# Patient Record
Sex: FEMALE | Race: WHITE | NOT HISPANIC OR LATINO | Employment: FULL TIME | URBAN - METROPOLITAN AREA
[De-identification: names, ages, dates, MRNs, and addresses within clinical notes are randomized per-mention and may not be internally consistent; named-entity substitution may affect disease eponyms.]

---

## 2023-06-15 ENCOUNTER — APPOINTMENT (OUTPATIENT)
Dept: PRIMARY CARE | Facility: CLINIC | Age: 63
End: 2023-06-15
Payer: COMMERCIAL

## 2023-06-16 ENCOUNTER — APPOINTMENT (OUTPATIENT)
Dept: PRIMARY CARE | Facility: CLINIC | Age: 63
End: 2023-06-16
Payer: COMMERCIAL

## 2023-06-19 ENCOUNTER — APPOINTMENT (OUTPATIENT)
Dept: PRIMARY CARE | Facility: CLINIC | Age: 63
End: 2023-06-19
Payer: COMMERCIAL

## 2023-06-20 ENCOUNTER — APPOINTMENT (OUTPATIENT)
Dept: PRIMARY CARE | Facility: CLINIC | Age: 63
End: 2023-06-20
Payer: COMMERCIAL

## 2023-06-21 ENCOUNTER — OFFICE VISIT (OUTPATIENT)
Dept: PRIMARY CARE | Facility: CLINIC | Age: 63
End: 2023-06-21
Payer: COMMERCIAL

## 2023-06-21 VITALS
OXYGEN SATURATION: 98 % | SYSTOLIC BLOOD PRESSURE: 180 MMHG | DIASTOLIC BLOOD PRESSURE: 120 MMHG | HEIGHT: 63 IN | RESPIRATION RATE: 18 BRPM | WEIGHT: 135.6 LBS | TEMPERATURE: 97.9 F | BODY MASS INDEX: 24.03 KG/M2 | HEART RATE: 86 BPM

## 2023-06-21 DIAGNOSIS — Z12.39 ENCOUNTER FOR SCREENING FOR MALIGNANT NEOPLASM OF BREAST, UNSPECIFIED SCREENING MODALITY: ICD-10-CM

## 2023-06-21 DIAGNOSIS — Z00.00 HEALTHCARE MAINTENANCE: Primary | ICD-10-CM

## 2023-06-21 DIAGNOSIS — D22.9 MULTIPLE NEVI: ICD-10-CM

## 2023-06-21 DIAGNOSIS — F43.9 STRESS: ICD-10-CM

## 2023-06-21 DIAGNOSIS — G47.9 SLEEP DISTURBANCE: ICD-10-CM

## 2023-06-21 DIAGNOSIS — Z12.11 SCREEN FOR COLON CANCER: ICD-10-CM

## 2023-06-21 LAB
ALANINE AMINOTRANSFERASE (SGPT) (U/L) IN SER/PLAS: 15 U/L (ref 7–45)
ALBUMIN (G/DL) IN SER/PLAS: 4.6 G/DL (ref 3.4–5)
ALKALINE PHOSPHATASE (U/L) IN SER/PLAS: 56 U/L (ref 33–136)
ANION GAP IN SER/PLAS: 14 MMOL/L (ref 10–20)
ASPARTATE AMINOTRANSFERASE (SGOT) (U/L) IN SER/PLAS: 23 U/L (ref 9–39)
BASOPHILS (10*3/UL) IN BLOOD BY AUTOMATED COUNT: 0.07 X10E9/L (ref 0–0.1)
BASOPHILS/100 LEUKOCYTES IN BLOOD BY AUTOMATED COUNT: 0.9 % (ref 0–2)
BILIRUBIN TOTAL (MG/DL) IN SER/PLAS: 0.4 MG/DL (ref 0–1.2)
CALCIUM (MG/DL) IN SER/PLAS: 9.9 MG/DL (ref 8.6–10.6)
CARBON DIOXIDE, TOTAL (MMOL/L) IN SER/PLAS: 26 MMOL/L (ref 21–32)
CHLORIDE (MMOL/L) IN SER/PLAS: 102 MMOL/L (ref 98–107)
CHOLESTEROL (MG/DL) IN SER/PLAS: 184 MG/DL (ref 0–199)
CHOLESTEROL IN HDL (MG/DL) IN SER/PLAS: 76.9 MG/DL
CHOLESTEROL/HDL RATIO: 2.4
CREATININE (MG/DL) IN SER/PLAS: 0.63 MG/DL (ref 0.5–1.05)
EOSINOPHILS (10*3/UL) IN BLOOD BY AUTOMATED COUNT: 0.06 X10E9/L (ref 0–0.7)
EOSINOPHILS/100 LEUKOCYTES IN BLOOD BY AUTOMATED COUNT: 0.8 % (ref 0–6)
ERYTHROCYTE DISTRIBUTION WIDTH (RATIO) BY AUTOMATED COUNT: 13.5 % (ref 11.5–14.5)
ERYTHROCYTE MEAN CORPUSCULAR HEMOGLOBIN CONCENTRATION (G/DL) BY AUTOMATED: 32.6 G/DL (ref 32–36)
ERYTHROCYTE MEAN CORPUSCULAR VOLUME (FL) BY AUTOMATED COUNT: 91 FL (ref 80–100)
ERYTHROCYTES (10*6/UL) IN BLOOD BY AUTOMATED COUNT: 4.79 X10E12/L (ref 4–5.2)
GFR FEMALE: >90 ML/MIN/1.73M2
GLUCOSE (MG/DL) IN SER/PLAS: 87 MG/DL (ref 74–99)
HEMATOCRIT (%) IN BLOOD BY AUTOMATED COUNT: 43.8 % (ref 36–46)
HEMOGLOBIN (G/DL) IN BLOOD: 14.3 G/DL (ref 12–16)
IMMATURE GRANULOCYTES/100 LEUKOCYTES IN BLOOD BY AUTOMATED COUNT: 0.3 % (ref 0–0.9)
LDL: 92 MG/DL (ref 0–99)
LEUKOCYTES (10*3/UL) IN BLOOD BY AUTOMATED COUNT: 7.6 X10E9/L (ref 4.4–11.3)
LYMPHOCYTES (10*3/UL) IN BLOOD BY AUTOMATED COUNT: 1.81 X10E9/L (ref 1.2–4.8)
LYMPHOCYTES/100 LEUKOCYTES IN BLOOD BY AUTOMATED COUNT: 23.9 % (ref 13–44)
MONOCYTES (10*3/UL) IN BLOOD BY AUTOMATED COUNT: 0.59 X10E9/L (ref 0.1–1)
MONOCYTES/100 LEUKOCYTES IN BLOOD BY AUTOMATED COUNT: 7.8 % (ref 2–10)
NEUTROPHILS (10*3/UL) IN BLOOD BY AUTOMATED COUNT: 5.03 X10E9/L (ref 1.2–7.7)
NEUTROPHILS/100 LEUKOCYTES IN BLOOD BY AUTOMATED COUNT: 66.3 % (ref 40–80)
NRBC (PER 100 WBCS) BY AUTOMATED COUNT: 0 /100 WBC (ref 0–0)
PLATELETS (10*3/UL) IN BLOOD AUTOMATED COUNT: 372 X10E9/L (ref 150–450)
POTASSIUM (MMOL/L) IN SER/PLAS: 4 MMOL/L (ref 3.5–5.3)
PROTEIN TOTAL: 7.6 G/DL (ref 6.4–8.2)
SODIUM (MMOL/L) IN SER/PLAS: 138 MMOL/L (ref 136–145)
TRIGLYCERIDE (MG/DL) IN SER/PLAS: 75 MG/DL (ref 0–149)
UREA NITROGEN (MG/DL) IN SER/PLAS: 6 MG/DL (ref 6–23)
VLDL: 15 MG/DL (ref 0–40)

## 2023-06-21 PROCEDURE — 80053 COMPREHEN METABOLIC PANEL: CPT

## 2023-06-21 PROCEDURE — 99396 PREV VISIT EST AGE 40-64: CPT | Performed by: NURSE PRACTITIONER

## 2023-06-21 PROCEDURE — 1036F TOBACCO NON-USER: CPT | Performed by: NURSE PRACTITIONER

## 2023-06-21 PROCEDURE — 85025 COMPLETE CBC W/AUTO DIFF WBC: CPT

## 2023-06-21 PROCEDURE — 83036 HEMOGLOBIN GLYCOSYLATED A1C: CPT

## 2023-06-21 PROCEDURE — 80061 LIPID PANEL: CPT

## 2023-06-21 PROCEDURE — 84443 ASSAY THYROID STIM HORMONE: CPT

## 2023-06-21 RX ORDER — MULTIVITAMIN
1 TABLET ORAL DAILY
COMMUNITY
Start: 2018-07-10

## 2023-06-21 RX ORDER — CALCIUM CARBONATE 600 MG
TABLET ORAL
COMMUNITY
Start: 2021-06-04

## 2023-06-21 ASSESSMENT — ENCOUNTER SYMPTOMS
HALLUCINATIONS: 0
ADENOPATHY: 0
WHEEZING: 0
DIZZINESS: 0
ABDOMINAL DISTENTION: 0
HEMATURIA: 0
UNEXPECTED WEIGHT CHANGE: 0
STRIDOR: 0
POLYDIPSIA: 0
WOUND: 0
EYE REDNESS: 0
NAUSEA: 0
BACK PAIN: 0
DYSURIA: 0
RHINORRHEA: 0
WEAKNESS: 0
AGITATION: 0
EYE ITCHING: 0
SINUS PAIN: 0
LIGHT-HEADEDNESS: 0
SHORTNESS OF BREATH: 0
SEIZURES: 0
TROUBLE SWALLOWING: 0
DYSPHORIC MOOD: 0
FACIAL SWELLING: 0
SORE THROAT: 0
POLYPHAGIA: 0
SINUS PRESSURE: 0
EYE DISCHARGE: 0
ACTIVITY CHANGE: 0
CONSTIPATION: 0
CHOKING: 0
VOMITING: 0
CHILLS: 0
ARTHRALGIAS: 0
CONFUSION: 0
JOINT SWELLING: 0
TREMORS: 0
BRUISES/BLEEDS EASILY: 0
HYPERACTIVE: 0
NECK STIFFNESS: 0
PHOTOPHOBIA: 0
MYALGIAS: 0
NECK PAIN: 0
DIARRHEA: 0
ANAL BLEEDING: 0
PALPITATIONS: 0
APPETITE CHANGE: 0
VOICE CHANGE: 0
COLOR CHANGE: 0
NUMBNESS: 0
SPEECH DIFFICULTY: 0
ABDOMINAL PAIN: 0
DIAPHORESIS: 0
FACIAL ASYMMETRY: 0
FLANK PAIN: 0
APNEA: 0
DECREASED CONCENTRATION: 0
RECTAL PAIN: 0
BLOOD IN STOOL: 0
FATIGUE: 0
FEVER: 0
CHEST TIGHTNESS: 0
HEADACHES: 0
SLEEP DISTURBANCE: 1
COUGH: 0
DIFFICULTY URINATING: 0
NERVOUS/ANXIOUS: 0
EYE PAIN: 0

## 2023-06-21 NOTE — PROGRESS NOTES
Subjective   Patient ID: Kiana Woods is a 62 y.o. female who presents for annual exam.    HPI   Patient in office for physical. Patient describes health as good. Patient has regular dental visits. Patient denies vision changes. Patient denies hearing loss.    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 Patient does not smoke; drinks alcohol occasionally. No drug use.    Reproductive health:  GTPAL: 63512  Postmenopausal    Screens:    Female:  PAP/HPV: Reviewed and current. Up-to-date on screenings with OB/GYN, per patient report  Breast Cancer: Reviewed and current. Does monthly SBE. Up-to-date on screenings with OB/GYN, per patient report  Colon cancer screening: Reviewed and current 2021/2024  Vision/hearing: Reviewed and current.       Immunizations:  TDaP: 2021 (per patient report)  Shingles: patient declines; patient will get at pharmacy   Flu: patient declines;  COVID: x 3 received; boosters at pharmacy  Hep B: patient declines    The patient declines recommended immunizations. Risks and benefits of immunizations reviewed. The patient verbalized understanding.    Concerns:  -stress due to difficult family situation; mother diagnosed with cancer; daughter will get  this summer; patient will try Melatonin for sleep   -multiple nevi; patient sees dermatology    Review of Systems   Constitutional:  Negative for activity change, appetite change, chills, diaphoresis, fatigue, fever and unexpected weight change.   HENT:  Negative for congestion, dental problem, drooling, ear discharge, ear pain, facial swelling, hearing loss, mouth sores, nosebleeds, postnasal drip, rhinorrhea, sinus pressure, sinus pain, sneezing, sore throat, tinnitus, trouble swallowing and voice change.    Eyes:  Negative for photophobia, pain, discharge, redness, itching and visual disturbance.   Respiratory:  Negative for apnea, cough, choking, chest tightness, shortness of breath,  "wheezing and stridor.    Cardiovascular:  Negative for chest pain, palpitations and leg swelling.   Gastrointestinal:  Negative for abdominal distention, abdominal pain, anal bleeding, blood in stool, constipation, diarrhea, nausea, rectal pain and vomiting.   Endocrine: Negative for cold intolerance, heat intolerance, polydipsia, polyphagia and polyuria.   Genitourinary:  Negative for decreased urine volume, difficulty urinating, dysuria, flank pain, hematuria, pelvic pain and urgency.   Musculoskeletal:  Negative for arthralgias, back pain, gait problem, joint swelling, myalgias, neck pain and neck stiffness.   Skin:  Negative for color change, pallor, rash and wound.   Neurological:  Negative for dizziness, tremors, seizures, syncope, facial asymmetry, speech difficulty, weakness, light-headedness, numbness and headaches.   Hematological:  Negative for adenopathy. Does not bruise/bleed easily.   Psychiatric/Behavioral:  Positive for sleep disturbance. Negative for agitation, behavioral problems, confusion, decreased concentration, dysphoric mood, hallucinations, self-injury and suicidal ideas. The patient is not nervous/anxious and is not hyperactive.        Objective   BP (!) 180/120   Pulse 86   Temp 36.6 °C (97.9 °F)   Resp 18   Ht 1.588 m (5' 2.5\")   Wt 61.5 kg (135 lb 9.6 oz)   SpO2 98%   BMI 24.41 kg/m²     Repeat BP: 140/82 (right arm, manual); 138/82 (left arm)     Physical Exam  Constitutional:       Appearance: Normal appearance. She is normal weight.   HENT:      Head: Normocephalic.      Right Ear: Tympanic membrane, ear canal and external ear normal.      Left Ear: Tympanic membrane, ear canal and external ear normal.      Nose: Nose normal.      Mouth/Throat:      Mouth: Mucous membranes are moist.      Pharynx: Oropharynx is clear. No oropharyngeal exudate or posterior oropharyngeal erythema.   Eyes:      General: No scleral icterus.        Right eye: No discharge.         Left eye: No " discharge.      Extraocular Movements: Extraocular movements intact.      Conjunctiva/sclera: Conjunctivae normal.      Pupils: Pupils are equal, round, and reactive to light.   Neck:      Vascular: No carotid bruit.   Cardiovascular:      Rate and Rhythm: Normal rate and regular rhythm.      Pulses: Normal pulses.      Heart sounds: Normal heart sounds.   Pulmonary:      Effort: Pulmonary effort is normal.      Breath sounds: Normal breath sounds.   Abdominal:      General: There is no distension.      Palpations: There is no mass.      Tenderness: There is no abdominal tenderness. There is no guarding or rebound.      Hernia: No hernia is present.   Musculoskeletal:         General: No swelling, tenderness, deformity or signs of injury.      Cervical back: Normal range of motion and neck supple. No rigidity or tenderness.      Right lower leg: No edema.      Left lower leg: No edema.   Lymphadenopathy:      Cervical: No cervical adenopathy.   Skin:     Coloration: Skin is not jaundiced or pale.      Findings: No bruising, erythema, lesion or rash.      Comments: Multiple nevi   Neurological:      General: No focal deficit present.      Mental Status: She is alert and oriented to person, place, and time.      Cranial Nerves: No cranial nerve deficit.      Sensory: No sensory deficit.      Motor: No weakness.      Coordination: Coordination normal.      Gait: Gait normal.   Psychiatric:         Mood and Affect: Mood normal.         Behavior: Behavior normal.         Thought Content: Thought content normal.         Judgment: Judgment normal.       Assessment/Plan     Exam findings reviewed with patient. Health screens, lab work orders, immunizations, and specialty provider follow-ups discussed. Patient will keep monitoring blood pressures at home; she will call the office with outliers or abnormal trends. We will call with lab results and update the patient on the plan of care. Follow-up in 12 months for physical or  earlier as needed.     Benefits of healthy lifestyle discussed: low carbohydrates/fat/sugar diet; use lean white instead of red meat; use several servings of fruit and vegetables daily; use whole grain flour instead of white flour products; cook at home frequently instead of eating out; exercise 30-90 minutes 5 x/week; good sleep hygiene; stress reduction and prevention strategies; avoid stimulants like caffeine and nicotine; avoid depressants like alcohol; maintain adequate support systems and positive relationships.

## 2023-06-22 LAB
ESTIMATED AVERAGE GLUCOSE FOR HBA1C: 105 MG/DL
HEMOGLOBIN A1C/HEMOGLOBIN TOTAL IN BLOOD: 5.3 %
THYROTROPIN (MIU/L) IN SER/PLAS BY DETECTION LIMIT <= 0.05 MIU/L: 1.11 MIU/L (ref 0.44–3.98)

## 2023-06-22 NOTE — RESULT ENCOUNTER NOTE
Please call the patient. All her labs are normal; fasting glucose is 87. A1C is pending but will be normal. We will complete her insurance paperwork. Please ask her if she needs us to send/fax it somewhere. Follow up in 1 year for physical and labs or earlier as needed. TY

## 2023-07-06 ENCOUNTER — TELEPHONE (OUTPATIENT)
Dept: PRIMARY CARE | Facility: CLINIC | Age: 63
End: 2023-07-06
Payer: COMMERCIAL

## 2023-07-06 DIAGNOSIS — G47.9 SLEEP DISTURBANCE: Primary | ICD-10-CM

## 2023-07-06 RX ORDER — GABAPENTIN 100 MG/1
CAPSULE ORAL
Qty: 30 CAPSULE | Refills: 1 | Status: SHIPPED | OUTPATIENT
Start: 2023-07-06 | End: 2023-07-21

## 2023-07-06 NOTE — TELEPHONE ENCOUNTER
Kiana Woods phoned, [phone]. Her mother is in hospice and her daughter is getting . She is not able to sleep at all. She has tried the melatonin you discussed. But this is not working. She is requesting that you prescribe her something mild to get her through this period.     Pharmacy: [facility], [place]

## 2024-05-21 ENCOUNTER — HOSPITAL ENCOUNTER (OUTPATIENT)
Dept: RADIOLOGY | Facility: CLINIC | Age: 64
Discharge: HOME | End: 2024-05-21
Payer: COMMERCIAL

## 2024-05-21 VITALS — BODY MASS INDEX: 24.02 KG/M2 | HEIGHT: 63 IN | WEIGHT: 135.58 LBS

## 2024-05-21 DIAGNOSIS — Z12.31 ENCOUNTER FOR SCREENING MAMMOGRAM FOR MALIGNANT NEOPLASM OF BREAST: ICD-10-CM

## 2024-05-21 PROCEDURE — 77067 SCR MAMMO BI INCL CAD: CPT | Performed by: RADIOLOGY

## 2024-05-21 PROCEDURE — 77067 SCR MAMMO BI INCL CAD: CPT

## 2024-05-21 PROCEDURE — 77063 BREAST TOMOSYNTHESIS BI: CPT | Performed by: RADIOLOGY

## 2024-06-10 ENCOUNTER — APPOINTMENT (OUTPATIENT)
Dept: PRIMARY CARE | Facility: CLINIC | Age: 64
End: 2024-06-10
Payer: COMMERCIAL

## 2024-06-11 ENCOUNTER — OFFICE VISIT (OUTPATIENT)
Dept: PRIMARY CARE | Facility: CLINIC | Age: 64
End: 2024-06-11
Payer: COMMERCIAL

## 2024-06-11 VITALS
DIASTOLIC BLOOD PRESSURE: 78 MMHG | HEART RATE: 72 BPM | SYSTOLIC BLOOD PRESSURE: 136 MMHG | OXYGEN SATURATION: 100 % | TEMPERATURE: 98 F | BODY MASS INDEX: 25.73 KG/M2 | RESPIRATION RATE: 16 BRPM | WEIGHT: 139.8 LBS | HEIGHT: 62 IN

## 2024-06-11 DIAGNOSIS — Z00.00 HEALTHCARE MAINTENANCE: Primary | ICD-10-CM

## 2024-06-11 DIAGNOSIS — Z12.11 SCREEN FOR COLON CANCER: ICD-10-CM

## 2024-06-11 DIAGNOSIS — D22.9 MULTIPLE NEVI: ICD-10-CM

## 2024-06-11 PROCEDURE — 99396 PREV VISIT EST AGE 40-64: CPT | Performed by: NURSE PRACTITIONER

## 2024-06-11 PROCEDURE — 80053 COMPREHEN METABOLIC PANEL: CPT

## 2024-06-11 PROCEDURE — 1036F TOBACCO NON-USER: CPT | Performed by: NURSE PRACTITIONER

## 2024-06-11 PROCEDURE — 83036 HEMOGLOBIN GLYCOSYLATED A1C: CPT

## 2024-06-11 PROCEDURE — 85025 COMPLETE CBC W/AUTO DIFF WBC: CPT

## 2024-06-11 PROCEDURE — 80061 LIPID PANEL: CPT

## 2024-06-11 PROCEDURE — 36415 COLL VENOUS BLD VENIPUNCTURE: CPT

## 2024-06-11 PROCEDURE — 84443 ASSAY THYROID STIM HORMONE: CPT

## 2024-06-11 ASSESSMENT — ENCOUNTER SYMPTOMS
WHEEZING: 0
WOUND: 0
PHOTOPHOBIA: 0
STRIDOR: 0
ADENOPATHY: 0
UNEXPECTED WEIGHT CHANGE: 0
NERVOUS/ANXIOUS: 0
HALLUCINATIONS: 0
SEIZURES: 0
EYE REDNESS: 0
COLOR CHANGE: 0
NUMBNESS: 0
LIGHT-HEADEDNESS: 0
NAUSEA: 0
DECREASED CONCENTRATION: 0
MYALGIAS: 0
VOMITING: 0
COUGH: 0
WEAKNESS: 0
FACIAL ASYMMETRY: 0
SINUS PRESSURE: 0
FLANK PAIN: 0
DYSPHORIC MOOD: 0
POLYPHAGIA: 0
BRUISES/BLEEDS EASILY: 0
VOICE CHANGE: 0
NECK STIFFNESS: 0
DIAPHORESIS: 0
FATIGUE: 0
CHOKING: 0
DIFFICULTY URINATING: 0
EYE PAIN: 0
CONFUSION: 0
SHORTNESS OF BREATH: 0
HYPERACTIVE: 0
APNEA: 0
RECTAL PAIN: 0
JOINT SWELLING: 0
AGITATION: 0
POLYDIPSIA: 0
SLEEP DISTURBANCE: 0
DIZZINESS: 0
NECK PAIN: 0
TREMORS: 0
SINUS PAIN: 0
CHEST TIGHTNESS: 0
SPEECH DIFFICULTY: 0
DYSURIA: 0
SORE THROAT: 0
ABDOMINAL DISTENTION: 0
DIARRHEA: 0
ABDOMINAL PAIN: 0
APPETITE CHANGE: 0
CHILLS: 0
FEVER: 0
HEADACHES: 0
BACK PAIN: 0
ACTIVITY CHANGE: 0
CONSTIPATION: 0
EYE DISCHARGE: 0
RHINORRHEA: 0
HEMATURIA: 0
ANAL BLEEDING: 0
FACIAL SWELLING: 0
PALPITATIONS: 0
ARTHRALGIAS: 0
TROUBLE SWALLOWING: 0
EYE ITCHING: 0
BLOOD IN STOOL: 0

## 2024-06-11 NOTE — PROGRESS NOTES
Subjective   Patient ID: Kiana Woods is a 63 y.o. female who presents for annual exam.    HPI   Patient in office for physical. Patient describes health as good. Patient has regular dental visits. Patient denies vision changes (sees opthalmology for mild cataracts). Patient denies hearing loss.    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 Patient does not smoke; drinks alcohol occasionally. No drug use.    Reproductive health:  GTPAL: [id number]  Postmenopausal    Screens:    Female:  PAP/HPV: Reviewed and current. Up-to-date on screenings with OB/GYN, per patient report  Breast Cancer: Reviewed and current. Does monthly SBE. Up-to-date on screenings with OB/GYN, per patient report  Colon cancer screening: Reviewed and current order  Vision/hearing: Reviewed and current.       Immunizations:  TDaP: 2021 (per patient report)  Shingles: patient declines; patient will get at pharmacy   Flu: patient declines;  COVID: x 3 received; boosters at pharmacy  Hep B: patient declines    The patient declines recommended immunizations. Risks and benefits of immunizations reviewed. The patient verbalized understanding.    Concerns:  -multiple nevi; patient sees dermatology    Review of Systems   Constitutional:  Negative for activity change, appetite change, chills, diaphoresis, fatigue, fever and unexpected weight change.   HENT:  Negative for congestion, dental problem, drooling, ear discharge, ear pain, facial swelling, hearing loss, mouth sores, nosebleeds, postnasal drip, rhinorrhea, sinus pressure, sinus pain, sneezing, sore throat, tinnitus, trouble swallowing and voice change.    Eyes:  Negative for photophobia, pain, discharge, redness, itching and visual disturbance.   Respiratory:  Negative for apnea, cough, choking, chest tightness, shortness of breath, wheezing and stridor.    Cardiovascular:  Negative for chest pain, palpitations and leg swelling.   Gastrointestinal:   "Negative for abdominal distention, abdominal pain, anal bleeding, blood in stool, constipation, diarrhea, nausea, rectal pain and vomiting.   Endocrine: Negative for cold intolerance, heat intolerance, polydipsia, polyphagia and polyuria.   Genitourinary:  Negative for decreased urine volume, difficulty urinating, dysuria, flank pain, hematuria, pelvic pain and urgency.   Musculoskeletal:  Negative for arthralgias, back pain, gait problem, joint swelling, myalgias, neck pain and neck stiffness.   Skin:  Negative for color change, pallor, rash and wound.   Neurological:  Negative for dizziness, tremors, seizures, syncope, facial asymmetry, speech difficulty, weakness, light-headedness, numbness and headaches.   Hematological:  Negative for adenopathy. Does not bruise/bleed easily.   Psychiatric/Behavioral:  Negative for agitation, behavioral problems, confusion, decreased concentration, dysphoric mood, hallucinations, self-injury, sleep disturbance and suicidal ideas. The patient is not nervous/anxious and is not hyperactive.        Objective   /78   Pulse 72   Temp 36.7 °C (98 °F) (Temporal)   Resp 16   Ht 1.575 m (5' 2\")   Wt 63.4 kg (139 lb 12.8 oz)   SpO2 100%   BMI 25.57 kg/m²       Physical Exam  Constitutional:       Appearance: Normal appearance. She is normal weight.   HENT:      Head: Normocephalic.      Right Ear: Tympanic membrane, ear canal and external ear normal.      Left Ear: Tympanic membrane, ear canal and external ear normal.      Nose: Nose normal.      Mouth/Throat:      Mouth: Mucous membranes are moist.      Pharynx: Oropharynx is clear. No oropharyngeal exudate or posterior oropharyngeal erythema.   Eyes:      General: No scleral icterus.        Right eye: No discharge.         Left eye: No discharge.      Extraocular Movements: Extraocular movements intact.      Conjunctiva/sclera: Conjunctivae normal.      Pupils: Pupils are equal, round, and reactive to light.   Neck:      " Vascular: No carotid bruit.   Cardiovascular:      Rate and Rhythm: Normal rate and regular rhythm.      Pulses: Normal pulses.      Heart sounds: Normal heart sounds.   Pulmonary:      Effort: Pulmonary effort is normal.      Breath sounds: Normal breath sounds.   Abdominal:      General: There is no distension.      Palpations: There is no mass.      Tenderness: There is no abdominal tenderness. There is no guarding or rebound.      Hernia: No hernia is present.   Musculoskeletal:         General: No swelling, tenderness, deformity or signs of injury.      Cervical back: Normal range of motion and neck supple. No rigidity or tenderness.      Right lower leg: No edema.      Left lower leg: No edema.   Lymphadenopathy:      Cervical: No cervical adenopathy.   Skin:     Coloration: Skin is not jaundiced or pale.      Findings: No bruising, erythema, lesion or rash.      Comments: Multiple nevi   Neurological:      General: No focal deficit present.      Mental Status: She is alert and oriented to person, place, and time.      Cranial Nerves: No cranial nerve deficit.      Sensory: No sensory deficit.      Motor: No weakness.      Coordination: Coordination normal.      Gait: Gait normal.   Psychiatric:         Mood and Affect: Mood normal.         Behavior: Behavior normal.         Thought Content: Thought content normal.         Judgment: Judgment normal.       Assessment/Plan     Exam findings reviewed with patient. Health screens, lab work orders, immunizations, and specialty provider follow-ups discussed. We will call with lab results and update the patient on the plan of care. Follow-up in 12 months for physical or earlier as needed.     Benefits of healthy lifestyle discussed: low carbohydrates/fat/sugar diet; use lean white instead of red meat; use several servings of fruit and vegetables daily; use whole grain flour instead of white flour products; cook at home frequently instead of eating out; exercise 30-90  minutes 5 x/week; good sleep hygiene; stress reduction and prevention strategies; avoid stimulants like caffeine and nicotine; avoid depressants like alcohol; maintain adequate support systems and positive relationships.

## 2024-06-12 LAB
ALBUMIN SERPL BCP-MCNC: 4.4 G/DL (ref 3.4–5)
ALP SERPL-CCNC: 60 U/L (ref 33–136)
ALT SERPL W P-5'-P-CCNC: 11 U/L (ref 7–45)
ANION GAP SERPL CALC-SCNC: 14 MMOL/L (ref 10–20)
AST SERPL W P-5'-P-CCNC: 17 U/L (ref 9–39)
BASOPHILS # BLD AUTO: 0.07 X10*3/UL (ref 0–0.1)
BASOPHILS NFR BLD AUTO: 1.3 %
BILIRUB SERPL-MCNC: 0.3 MG/DL (ref 0–1.2)
BUN SERPL-MCNC: 10 MG/DL (ref 6–23)
CALCIUM SERPL-MCNC: 9.4 MG/DL (ref 8.6–10.6)
CHLORIDE SERPL-SCNC: 104 MMOL/L (ref 98–107)
CHOLEST SERPL-MCNC: 199 MG/DL (ref 0–199)
CHOLESTEROL/HDL RATIO: 2.7
CO2 SERPL-SCNC: 25 MMOL/L (ref 21–32)
CREAT SERPL-MCNC: 0.63 MG/DL (ref 0.5–1.05)
EGFRCR SERPLBLD CKD-EPI 2021: >90 ML/MIN/1.73M*2
EOSINOPHIL # BLD AUTO: 0.09 X10*3/UL (ref 0–0.7)
EOSINOPHIL NFR BLD AUTO: 1.6 %
ERYTHROCYTE [DISTWIDTH] IN BLOOD BY AUTOMATED COUNT: 13.6 % (ref 11.5–14.5)
EST. AVERAGE GLUCOSE BLD GHB EST-MCNC: 103 MG/DL
GLUCOSE SERPL-MCNC: 101 MG/DL (ref 74–99)
HBA1C MFR BLD: 5.2 %
HCT VFR BLD AUTO: 45.3 % (ref 36–46)
HDLC SERPL-MCNC: 72.8 MG/DL
HGB BLD-MCNC: 14.5 G/DL (ref 12–16)
IMM GRANULOCYTES # BLD AUTO: 0.01 X10*3/UL (ref 0–0.7)
IMM GRANULOCYTES NFR BLD AUTO: 0.2 % (ref 0–0.9)
LDLC SERPL CALC-MCNC: 115 MG/DL
LYMPHOCYTES # BLD AUTO: 1.24 X10*3/UL (ref 1.2–4.8)
LYMPHOCYTES NFR BLD AUTO: 22.7 %
MCH RBC QN AUTO: 30.2 PG (ref 26–34)
MCHC RBC AUTO-ENTMCNC: 32 G/DL (ref 32–36)
MCV RBC AUTO: 94 FL (ref 80–100)
MONOCYTES # BLD AUTO: 0.38 X10*3/UL (ref 0.1–1)
MONOCYTES NFR BLD AUTO: 6.9 %
NEUTROPHILS # BLD AUTO: 3.68 X10*3/UL (ref 1.2–7.7)
NEUTROPHILS NFR BLD AUTO: 67.3 %
NON HDL CHOLESTEROL: 126 MG/DL (ref 0–149)
NRBC BLD-RTO: 0 /100 WBCS (ref 0–0)
PLATELET # BLD AUTO: 344 X10*3/UL (ref 150–450)
POTASSIUM SERPL-SCNC: 4.3 MMOL/L (ref 3.5–5.3)
PROT SERPL-MCNC: 7.3 G/DL (ref 6.4–8.2)
RBC # BLD AUTO: 4.8 X10*6/UL (ref 4–5.2)
SODIUM SERPL-SCNC: 139 MMOL/L (ref 136–145)
TRIGL SERPL-MCNC: 56 MG/DL (ref 0–149)
TSH SERPL-ACNC: 1.44 MIU/L (ref 0.44–3.98)
VLDL: 11 MG/DL (ref 0–40)
WBC # BLD AUTO: 5.5 X10*3/UL (ref 4.4–11.3)

## 2024-06-12 NOTE — RESULT ENCOUNTER NOTE
Please call the patient. Slightly elevated fasting blood sugar and LDL (bad cholesterol). [facility] healthy diet and exercise. Recheck labs in 1 year.

## 2024-06-19 LAB — NONINV COLON CA DNA+OCC BLD SCRN STL QL: NEGATIVE

## 2025-05-22 ENCOUNTER — HOSPITAL ENCOUNTER (OUTPATIENT)
Dept: RADIOLOGY | Facility: CLINIC | Age: 65
Discharge: HOME | End: 2025-05-22
Payer: COMMERCIAL

## 2025-05-22 VITALS — BODY MASS INDEX: 25.57 KG/M2 | WEIGHT: 139.8 LBS

## 2025-05-22 DIAGNOSIS — Z12.31 ENCOUNTER FOR SCREENING MAMMOGRAM FOR MALIGNANT NEOPLASM OF BREAST: ICD-10-CM

## 2025-05-22 PROCEDURE — 77067 SCR MAMMO BI INCL CAD: CPT | Performed by: RADIOLOGY

## 2025-05-22 PROCEDURE — 77067 SCR MAMMO BI INCL CAD: CPT

## 2025-05-22 PROCEDURE — 77063 BREAST TOMOSYNTHESIS BI: CPT | Performed by: RADIOLOGY

## 2025-06-08 PROBLEM — F41.9 ANXIETY: Status: ACTIVE | Noted: 2025-06-08

## 2025-06-08 NOTE — PROGRESS NOTES
Subjective   Patient ID: Kiana Woods is a 64 y.o. female who presents for No chief complaint on file..  Well Adult Physical   Patient here for a comprehensive physical exam.The patient reports no problems    Diet: Well balanced     Exercise: walking daily    Social History    Tobacco Use      Smoking status: Never      Smokeless tobacco: Never    Vaping Use      Vaping status: Never Used    Alcohol use: Yes      Comment: occasional    Drug use: Never        Immunization History  Administered            Date(s) Administered    COVID-19, mRNA, LNP-S, PF, 30 mcg/0.3 mL dose                          02/12/2021 03/05/2021 12/20/2021      Last pap: June 2024  History of abnormal pap:    Last mammogram: May 2025    Last colon cancer screening: Cologuard 2024                   Review of Systems   Constitutional:  Negative for appetite change, chills and fever.   HENT:  Negative for ear pain, rhinorrhea, sinus pain and sore throat.    Eyes:  Negative for discharge and visual disturbance.   Respiratory:  Negative for cough and shortness of breath.    Cardiovascular:  Negative for chest pain, palpitations and leg swelling.   Gastrointestinal:  Negative for abdominal distention, abdominal pain, blood in stool, constipation, diarrhea, nausea and vomiting.   Endocrine: Negative for cold intolerance, heat intolerance, polydipsia and polyuria.   Genitourinary:  Negative for difficulty urinating, dysuria and frequency.   Musculoskeletal:  Negative for back pain and myalgias.   Skin:  Negative for rash.   Neurological:  Negative for dizziness, weakness, numbness and headaches.   Hematological:  Negative for adenopathy.   Psychiatric/Behavioral:  Negative for agitation, confusion and hallucinations.        Objective   Physical Exam  Constitutional:       Appearance: Normal appearance.   HENT:      Head: Normocephalic and atraumatic.      Right Ear: Tympanic membrane normal.      Left Ear: Tympanic membrane normal.      Nose: Nose  normal.      Mouth/Throat:      Mouth: Mucous membranes are moist.      Pharynx: Oropharynx is clear.   Eyes:      Conjunctiva/sclera: Conjunctivae normal.      Pupils: Pupils are equal, round, and reactive to light.   Cardiovascular:      Rate and Rhythm: Normal rate and regular rhythm.   Pulmonary:      Effort: Pulmonary effort is normal.      Breath sounds: Normal breath sounds.   Abdominal:      General: Abdomen is flat. Bowel sounds are normal.      Palpations: Abdomen is soft.   Skin:     General: Skin is warm and dry.   Neurological:      Mental Status: She is alert.   Psychiatric:         Mood and Affect: Mood normal.         Behavior: Behavior normal.         Assessment & Plan  Healthcare maintenance    Orders:    Lipid panel; Future    Glucose, random; Future    Hemoglobin A1c; Future    HIV 1/2 Antigen/Antibody Screen with Reflex to Confirmation; Future    Hepatitis C antibody; Future       Follow up in 1 year

## 2025-06-09 ENCOUNTER — APPOINTMENT (OUTPATIENT)
Dept: PRIMARY CARE | Facility: CLINIC | Age: 65
End: 2025-06-09
Payer: COMMERCIAL

## 2025-06-09 ENCOUNTER — OFFICE VISIT (OUTPATIENT)
Dept: PRIMARY CARE | Facility: CLINIC | Age: 65
End: 2025-06-09
Payer: COMMERCIAL

## 2025-06-09 VITALS
TEMPERATURE: 97.8 F | HEART RATE: 87 BPM | RESPIRATION RATE: 16 BRPM | SYSTOLIC BLOOD PRESSURE: 138 MMHG | HEIGHT: 62 IN | OXYGEN SATURATION: 98 % | DIASTOLIC BLOOD PRESSURE: 87 MMHG | BODY MASS INDEX: 25.06 KG/M2 | WEIGHT: 136.2 LBS

## 2025-06-09 DIAGNOSIS — Z00.00 HEALTHCARE MAINTENANCE: Primary | ICD-10-CM

## 2025-06-09 PROCEDURE — 99396 PREV VISIT EST AGE 40-64: CPT | Performed by: FAMILY MEDICINE

## 2025-06-09 ASSESSMENT — ENCOUNTER SYMPTOMS
SINUS PAIN: 0
FEVER: 0
COUGH: 0
POLYDIPSIA: 0
CONFUSION: 0
CHILLS: 0
BLOOD IN STOOL: 0
DIFFICULTY URINATING: 0
DIZZINESS: 0
ABDOMINAL DISTENTION: 0
NUMBNESS: 0
AGITATION: 0
CONSTIPATION: 0
SORE THROAT: 0
RHINORRHEA: 0
DIARRHEA: 0
PALPITATIONS: 0
APPETITE CHANGE: 0
EYE DISCHARGE: 0
FREQUENCY: 0
HEADACHES: 0
ABDOMINAL PAIN: 0
MYALGIAS: 0
DYSURIA: 0
HALLUCINATIONS: 0
ADENOPATHY: 0
BACK PAIN: 0
WEAKNESS: 0
SHORTNESS OF BREATH: 0
NAUSEA: 0
VOMITING: 0

## 2025-06-09 ASSESSMENT — PATIENT HEALTH QUESTIONNAIRE - PHQ9
2. FEELING DOWN, DEPRESSED OR HOPELESS: NOT AT ALL
SUM OF ALL RESPONSES TO PHQ9 QUESTIONS 1 AND 2: 0
1. LITTLE INTEREST OR PLEASURE IN DOING THINGS: NOT AT ALL

## 2025-06-10 LAB
CHOLEST SERPL-MCNC: 201 MG/DL
CHOLEST/HDLC SERPL: 2.8 (CALC)
EST. AVERAGE GLUCOSE BLD GHB EST-MCNC: 111 MG/DL
EST. AVERAGE GLUCOSE BLD GHB EST-SCNC: 6.2 MMOL/L
GLUCOSE SERPL-MCNC: 86 MG/DL (ref 65–99)
HBA1C MFR BLD: 5.5 %
HCV AB SERPL QL IA: NORMAL
HDLC SERPL-MCNC: 73 MG/DL
HIV 1+2 AB+HIV1 P24 AG SERPL QL IA: NORMAL
LDLC SERPL CALC-MCNC: 114 MG/DL (CALC)
NONHDLC SERPL-MCNC: 128 MG/DL (CALC)
TRIGL SERPL-MCNC: 57 MG/DL